# Patient Record
(demographics unavailable — no encounter records)

---

## 2019-01-28 NOTE — ER DOCUMENT REPORT
ED General





- General


Chief Complaint: Abscess


Stated Complaint: ABSCESS


Time Seen by Provider: 01/28/19 16:35


Primary Care Provider: 


[facility] [Outside] - Follow up as needed


Mode of Arrival: Ambulatory


Information source: Patient


TRAVEL OUTSIDE OF THE [place] IN LAST 30 DAYS: No





- HPI


Patient complains to provider of: Abscess


Onset: Other - 27-year-old female presents for complaint of pain and swelling in

 her right labia which developed over the last 2 days.  Nothing is made it 

better or worse, she is never taken or had anything like this in the past.  

Denies any change in sexual partners or other symptoms at this time.





- Related Data


Allergies/Adverse Reactions: 


                                        





No Known Allergies Allergy (Verified 01/28/19 16:38)


   











Past Medical History





- General


Information source: Patient





- Social History


Smoking Status: Current Every Day Smoker


Chew tobacco use (# tins/day): No


Smoking Education Provided: Yes


Frequency of alcohol use: None


Drug Abuse: None


Family History: CVA, DM, Hypertension


Patient has suicidal ideation: No


Patient has homicidal ideation: No


Neurological Medical History: Reports: Hx Migraine


Renal/ Medical History: Reports: Hx Kidney Stones.  Denies: Hx Peritoneal 

Dialysis


Psychiatric Medical History: Reports: Hx Anxiety





- Immunizations


Immunizations up to date: Yes


Hx Diphtheria, Pertussis, Tetanus Vaccination: Yes





Review of Systems





- Review of Systems


-: Yes All other systems reviewed and negative





Physical Exam





- Vital signs


Vitals: 


                                        











Temp Pulse Resp BP Pulse Ox


 


 98.3 F   89   18   112/62   100 


 


 01/28/19 14:28  01/28/19 14:28  01/28/19 14:28  01/28/19 14:28  01/28/19 14:28














- General


General appearance: Appears well, Alert





- HEENT


Head: Normocephalic, Atraumatic


Eyes: Normal


Pupils: PERRL





- Respiratory


Respiratory status: No respiratory distress





- Abdominal


Inspection: Normal


Distension: No distension





- Genitourinary


External exam: Other - Chaperone is present, the right labia demonstrates 

swelling and tenderness in a terse 2 x 3 cm area





- Back


Back: Normal





- Extremities


General upper extremity: Normal inspection, Nontender, Normal color, Normal ROM,

 Normal temperature


General lower extremity: Normal inspection, Nontender, Normal color, Normal ROM,

 Normal temperature, Normal weight bearing.  No: Tod's sign





- Neurological


Neuro grossly intact: Yes


Cognition: Normal


Orientation: AAOx4


Rumsey Coma Scale Eye Opening: Spontaneous


Rumsey Coma Scale Verbal: Oriented


Rumsey Coma Scale Motor: Obeys Commands


Vianney Coma Scale Total: 15


Speech: Normal


Motor strength normal: LUE, RUE, LLE, RLE


Sensory: Normal





Course





- Re-evaluation


Re-evalutation: 


27-year-old female presents for evaluation of vaginal swelling.


Her right labia demonstrates what appears to be an abscess in the lateral 

aspect, there is non-the introitus and not profoundly swollen do not believe 

that this represents a Bartholin gland cyst believe this likely represents an 

actual abscess in the skin there to.


Performed incision and drainage at the bedside utilizing lidocaine and a 

scalpel.


Copious purulent material was expressed.


Will prophylactically cover for chlamydia for this patient as sexually 

transmitted infections could potentially be an underlying cause of her abscess.


She was discharged with return precautions and expectant management 

encouragement to abstain from sexual intercourse over the next several days 

until this heals.





- Vital Signs


Vital signs: 


                                        











Temp Pulse Resp BP Pulse Ox


 


 97.9 F   69   19   104/68   97 


 


 01/28/19 20:46  01/28/19 20:46  01/28/19 20:46  01/28/19 20:46  01/28/19 20:46














Procedures





- Incision and Drainage


  ** Right Medial Labia


Type: Simple, Single


Anesthetic type: 1% Lidocaine


mL's of anesthetic: 5


Blade size: 11


I&D procedure: Betadine prep applied


Incision Method: Incision made by scalpel


Amount/type of drainage: 7


Female anatomy: 


                            __________________________














                            __________________________





 1 - abscess








Discharge





- Discharge


Clinical Impression: 


 Abscess, Labial swelling





Condition: Good


Disposition: HOME, SELF-CARE


Instructions:  Abscess ([facility])


Additional Instructions: 


You were seen today in the emergency department for your swollen labia.


You had evaluation including a physical exam, you had an incision and drainage 

done.


You should use the antibiotic prescribed to you for the next 10 days.


Return in case of worsening fevers or chills.


Apply heat to this area twice daily for 15 minutes.


Pull out the packing in 2 days.  Follow-up with your primary doctor this week 

for a wound check.


Prescriptions: 


Doxycycline Hyclate 100 mg PO BID #20 capsule


Forms:  Return to Work


Referrals: 


[facility] [Outside] - Follow up as needed

## 2019-01-28 NOTE — ER DOCUMENT REPORT
ED Medical Screen (RME)





- General


Chief Complaint: Abscess


Stated Complaint: ABSCESS


Time Seen by Provider: 01/28/19 16:35


Notes: 





Pt. is a 27 year old female presents to the ED with a potential abscess near her

right labia.  Patient states she had noticed at 4-5 days ago.  States it is 

painful in nature.  Patient states she did apply a warm rag but has noticed no 

discharge from the area.





Past medical history: None


Medications: None


Allergies: None








Patient's genitalia were not assessed while patient was in the pit triage.  

Waiting for a room to fully assess the patient.


LUNGS: Clear to auscultation bilaterally, no wheezes, rales, or rhonchi. No 

respiratory distress.








I have greeted and performed a rapid initial assessment of this patient.  A 

comprehensive ED assessment and evaluation of the patient, analysis of test 

results and completion of the medical decision making process will be conducted 

by additional ED providers.





TRAVEL OUTSIDE OF THE [place] IN LAST 30 DAYS: No





- Related Data


Allergies/Adverse Reactions: 


                                        





No Known Allergies Allergy (Verified 01/26/15 14:12)


   











Past Medical History


Neurological Medical History: Reports: Hx Migraine


Renal/ Medical History: Reports: Hx Kidney Stones


Psychiatric Medical History: Reports: Hx Anxiety





- Immunizations


Immunizations up to date: Yes


Hx Diphtheria, Pertussis, Tetanus Vaccination: Yes





Physical Exam





- Vital signs


Vitals: 





                                        











Temp Pulse Resp BP Pulse Ox


 


 98.3 F   89   18   112/62   100 


 


 01/28/19 14:28  01/28/19 14:28  01/28/19 14:28  01/28/19 14:28  01/28/19 14:28














Course





- Vital Signs


Vital signs: 





                                        











Temp Pulse Resp BP Pulse Ox


 


 98.3 F   89   18   112/62   100 


 


 01/28/19 14:28  01/28/19 14:28  01/28/19 14:28  01/28/19 14:28  01/28/19 14:28